# Patient Record
Sex: FEMALE | Race: WHITE
[De-identification: names, ages, dates, MRNs, and addresses within clinical notes are randomized per-mention and may not be internally consistent; named-entity substitution may affect disease eponyms.]

---

## 2016-01-22 VITALS — SYSTOLIC BLOOD PRESSURE: 166 MMHG | DIASTOLIC BLOOD PRESSURE: 80 MMHG

## 2022-04-29 NOTE — [facility]
EXAM: Brain MRI without contrast.



HISTORY: Cognitive changes. Postconcussion syndrome.



TECHNIQUE: Multiplanar, multisequence magnetic resonance imaging of the brain was performed without c
ontrast.



COMPARISON: None.



FINDINGS: There is no restricted diffusion to suggest acute or subacute infarction. There is no mass 
effect or midline shift. There is no hydrocephalus. There is no susceptibility effect to suggest hemo
rrhage.



There is a small nonspecific focus of slight FLAIR hyperintensity within the left centrum semiovale. 
The orbits are unremarkable. There is mild left maxillary sinus mucosal thickening with small mucous 
retention cysts. The mastoid air cells are clear. There are normal flow voids within the cerebral ves
sels. There is no suspicious calvarial lesion.



IMPRESSION: 

1. No acute intracranial finding.

2. Single small nonspecific focus of signal change of the left centrum semiovale. The differential in
cludes changes due to chronic small vessel disease as well as a focus of demyelination.



Electronically signed by: Sarina Keene MD (4/29/2022 4:23 PM) [id number]

## 2022-05-02 ENCOUNTER — HOSPITAL ENCOUNTER (OUTPATIENT)
Dept: HOSPITAL 61 - KCIC MRI | Age: 52
End: 2022-05-02
Attending: NURSE PRACTITIONER
Payer: COMMERCIAL

## 2022-05-02 DIAGNOSIS — J34.1: ICD-10-CM

## 2022-05-02 DIAGNOSIS — R41.89: ICD-10-CM

## 2022-05-02 DIAGNOSIS — F07.81: ICD-10-CM

## 2022-05-02 DIAGNOSIS — G93.89: ICD-10-CM

## 2022-05-02 DIAGNOSIS — J32.0: Primary | ICD-10-CM

## 2022-05-02 PROCEDURE — 70551 MRI BRAIN STEM W/O DYE: CPT
